# Patient Record
Sex: MALE | Race: WHITE | NOT HISPANIC OR LATINO | RURAL
[De-identification: names, ages, dates, MRNs, and addresses within clinical notes are randomized per-mention and may not be internally consistent; named-entity substitution may affect disease eponyms.]

---

## 2024-08-13 ENCOUNTER — APPOINTMENT (OUTPATIENT)
Age: 49
Setting detail: DERMATOLOGY
End: 2024-08-14

## 2024-08-13 DIAGNOSIS — L21.8 OTHER SEBORRHEIC DERMATITIS: ICD-10-CM

## 2024-08-13 DIAGNOSIS — L663 OTHER SPECIFIED DISEASES OF HAIR AND HAIR FOLLICLES: ICD-10-CM

## 2024-08-13 DIAGNOSIS — L738 OTHER SPECIFIED DISEASES OF HAIR AND HAIR FOLLICLES: ICD-10-CM

## 2024-08-13 PROBLEM — L02.12 FURUNCLE OF NECK: Status: ACTIVE | Noted: 2024-08-13

## 2024-08-13 PROCEDURE — OTHER COUNSELING: OTHER

## 2024-08-13 PROCEDURE — 99203 OFFICE O/P NEW LOW 30 MIN: CPT

## 2024-08-13 PROCEDURE — OTHER PRESCRIPTION: OTHER

## 2024-08-13 PROCEDURE — OTHER MIPS QUALITY: OTHER

## 2024-08-13 PROCEDURE — OTHER PRESCRIPTION MEDICATION MANAGEMENT: OTHER

## 2024-08-13 RX ORDER — CLOBETASOL PROPIONATE 0.5 MG/ML
SOLUTION TOPICAL QD
Qty: 1 | Refills: 3 | Status: ERX | COMMUNITY
Start: 2024-08-13

## 2024-08-13 RX ORDER — DOXYCYCLINE HYCLATE 100 MG/1
CAPSULE, GELATIN COATED ORAL
Qty: 14 | Refills: 2 | Status: ERX | COMMUNITY
Start: 2024-08-13

## 2024-08-13 RX ORDER — DESONIDE 0.5 MG/G
CREAM TOPICAL
Qty: 15 | Refills: 3 | Status: ERX | COMMUNITY
Start: 2024-08-13

## 2024-08-13 ASSESSMENT — LOCATION SIMPLE DESCRIPTION DERM
LOCATION SIMPLE: HAIR
LOCATION SIMPLE: RIGHT ANTERIOR NECK
LOCATION SIMPLE: LEFT ANTERIOR NECK

## 2024-08-13 ASSESSMENT — LOCATION DETAILED DESCRIPTION DERM
LOCATION DETAILED: LEFT SUPERIOR ANTERIOR NECK
LOCATION DETAILED: HAIR
LOCATION DETAILED: LEFT INFERIOR LATERAL NECK
LOCATION DETAILED: RIGHT INFERIOR LATERAL NECK

## 2024-08-13 ASSESSMENT — LOCATION ZONE DERM
LOCATION ZONE: SCALP
LOCATION ZONE: NECK

## 2024-08-13 NOTE — PROCEDURE: PRESCRIPTION MEDICATION MANAGEMENT
Samples Given: CeraVe facial cleanser
Detail Level: Zone
Render In Strict Bullet Format?: No
Initiate Treatment: Doxy 100mg qd for 14 days\\nDesonide cream
Initiate Treatment: Clobetosol scalp solution

## 2024-10-22 ENCOUNTER — APPOINTMENT (OUTPATIENT)
Age: 49
Setting detail: DERMATOLOGY
End: 2024-10-23

## 2024-10-22 DIAGNOSIS — D49.2 NEOPLASM OF UNSPECIFIED BEHAVIOR OF BONE, SOFT TISSUE, AND SKIN: ICD-10-CM

## 2024-10-22 DIAGNOSIS — L738 OTHER SPECIFIED DISEASES OF HAIR AND HAIR FOLLICLES: ICD-10-CM

## 2024-10-22 DIAGNOSIS — L21.8 OTHER SEBORRHEIC DERMATITIS: ICD-10-CM

## 2024-10-22 DIAGNOSIS — L663 OTHER SPECIFIED DISEASES OF HAIR AND HAIR FOLLICLES: ICD-10-CM

## 2024-10-22 PROBLEM — L02.12 FURUNCLE OF NECK: Status: ACTIVE | Noted: 2024-10-22

## 2024-10-22 PROCEDURE — OTHER COUNSELING: OTHER

## 2024-10-22 PROCEDURE — OTHER PRESCRIPTION MEDICATION MANAGEMENT: OTHER

## 2024-10-22 PROCEDURE — OTHER MIPS QUALITY: OTHER

## 2024-10-22 PROCEDURE — 11103 TANGNTL BX SKIN EA SEP/ADDL: CPT

## 2024-10-22 PROCEDURE — 11102 TANGNTL BX SKIN SINGLE LES: CPT

## 2024-10-22 PROCEDURE — OTHER BIOPSY BY SHAVE METHOD: OTHER

## 2024-10-22 PROCEDURE — 99214 OFFICE O/P EST MOD 30 MIN: CPT | Mod: 25

## 2024-10-22 PROCEDURE — OTHER PRESCRIPTION: OTHER

## 2024-10-22 PROCEDURE — OTHER RECOMMENDATIONS: OTHER

## 2024-10-22 PROCEDURE — OTHER SEPARATE AND IDENTIFIABLE DOCUMENTATION: OTHER

## 2024-10-22 RX ORDER — FLUOCINOLONE ACETONIDE 0.11 MG/ML
OIL TOPICAL QHS
Qty: 118.28 | Refills: 3 | Status: ERX | COMMUNITY
Start: 2024-10-22

## 2024-10-22 ASSESSMENT — LOCATION SIMPLE DESCRIPTION DERM
LOCATION SIMPLE: RIGHT ANTERIOR NECK
LOCATION SIMPLE: CHEST
LOCATION SIMPLE: LEFT SHOULDER
LOCATION SIMPLE: LEFT ANTERIOR NECK
LOCATION SIMPLE: HAIR

## 2024-10-22 ASSESSMENT — LOCATION ZONE DERM
LOCATION ZONE: SCALP
LOCATION ZONE: ARM
LOCATION ZONE: NECK
LOCATION ZONE: TRUNK

## 2024-10-22 ASSESSMENT — LOCATION DETAILED DESCRIPTION DERM
LOCATION DETAILED: RIGHT MEDIAL INFERIOR CHEST
LOCATION DETAILED: LEFT SUPERIOR ANTERIOR NECK
LOCATION DETAILED: RIGHT INFERIOR LATERAL NECK
LOCATION DETAILED: LEFT INFERIOR LATERAL NECK
LOCATION DETAILED: HAIR
LOCATION DETAILED: LEFT POSTERIOR SHOULDER

## 2024-10-22 NOTE — PROCEDURE: RECOMMENDATIONS
negative
Render Risk Assessment In Note?: no
Patient Management Risk Assessment: Moderate
Recommendation Preamble: The following recommendations were made during the visit: Biopsy
Detail Level: Zone

## 2024-10-22 NOTE — PROCEDURE: BIOPSY BY SHAVE METHOD
Detail Level: Detailed
Depth Of Biopsy: dermis
Was A Bandage Applied: Yes
Size Of Lesion In Cm: 0.4
X Size Of Lesion In Cm: 0
Biopsy Type: H and E
Biopsy Method: Dermablade
Anesthesia Type: 1% lidocaine with epinephrine
Anesthesia Volume In Cc: 0.5
Hemostasis: Drysol
Wound Care: Petrolatum
Dressing: bandage
Destruction After The Procedure: No
Type Of Destruction Used: Curettage
Curettage Text: The wound bed was treated with curettage after the biopsy was performed.
Cryotherapy Text: The wound bed was treated with cryotherapy after the biopsy was performed.
Electrodesiccation Text: The wound bed was treated with electrodesiccation after the biopsy was performed.
Electrodesiccation And Curettage Text: The wound bed was treated with electrodesiccation and curettage after the biopsy was performed.
Silver Nitrate Text: The wound bed was treated with silver nitrate after the biopsy was performed.
Lab: [id number]
Lab Facility: [id number]
Consent: Written consent was obtained and risks were reviewed including but not limited to scarring, infection, bleeding, scabbing, incomplete removal, nerve damage and allergy to anesthesia.
Post-Care Instructions: I reviewed with the patient in detail post-care instructions. Patient is to keep the biopsy site dry overnight, and then apply bacitracin twice daily until healed. Patient may apply hydrogen peroxide soaks to remove any crusting.
Notification Instructions: Patient will be notified of biopsy results. However, patient instructed to call the office if not contacted within 2 weeks.
Billing Type: Third-Party Bill
Information: Selecting Yes will display possible errors in your note based on the variables you have selected. This validation is only offered as a suggestion for you. PLEASE NOTE THAT THE VALIDATION TEXT WILL BE REMOVED WHEN YOU FINALIZE YOUR NOTE. IF YOU WANT TO FAX A PRELIMINARY NOTE YOU WILL NEED TO TOGGLE THIS TO 'NO' IF YOU DO NOT WANT IT IN YOUR FAXED NOTE.

## 2024-10-22 NOTE — PROCEDURE: PRESCRIPTION MEDICATION MANAGEMENT
Continue Regimen: Doxy 100mg qd for 14 days\\nDesonide cream
Samples Given: CeraVe facial cleanser
Detail Level: Zone
Render In Strict Bullet Format?: No
Continue Regimen: Clobetosol scalp solution

## 2024-12-13 ENCOUNTER — APPOINTMENT (OUTPATIENT)
Age: 49
Setting detail: DERMATOLOGY
End: 2024-12-16

## 2024-12-13 DIAGNOSIS — D22 MELANOCYTIC NEVI: ICD-10-CM

## 2024-12-13 PROBLEM — D22.62 MELANOCYTIC NEVI OF LEFT UPPER LIMB, INCLUDING SHOULDER: Status: ACTIVE | Noted: 2024-12-13

## 2024-12-13 PROCEDURE — OTHER MIPS QUALITY: OTHER

## 2024-12-13 PROCEDURE — 12032 INTMD RPR S/A/T/EXT 2.6-7.5: CPT

## 2024-12-13 PROCEDURE — OTHER EXCISION: OTHER

## 2024-12-13 PROCEDURE — 11403 EXC TR-EXT B9+MARG 2.1-3CM: CPT

## 2024-12-13 ASSESSMENT — LOCATION SIMPLE DESCRIPTION DERM: LOCATION SIMPLE: LEFT SHOULDER

## 2024-12-13 ASSESSMENT — LOCATION DETAILED DESCRIPTION DERM: LOCATION DETAILED: LEFT POSTERIOR SHOULDER

## 2024-12-13 ASSESSMENT — LOCATION ZONE DERM: LOCATION ZONE: ARM

## 2024-12-13 NOTE — PROCEDURE: EXCISION

## 2024-12-23 ENCOUNTER — APPOINTMENT (OUTPATIENT)
Age: 49
Setting detail: DERMATOLOGY
End: 2024-12-24

## 2024-12-23 DIAGNOSIS — D22 MELANOCYTIC NEVI: ICD-10-CM

## 2024-12-23 DIAGNOSIS — Z48.02 ENCOUNTER FOR REMOVAL OF SUTURES: ICD-10-CM

## 2024-12-23 PROBLEM — D48.5 NEOPLASM OF UNCERTAIN BEHAVIOR OF SKIN: Status: ACTIVE | Noted: 2024-12-23

## 2024-12-23 PROCEDURE — OTHER BIOPSY BY SHAVE METHOD: OTHER

## 2024-12-23 PROCEDURE — 11102 TANGNTL BX SKIN SINGLE LES: CPT | Mod: 79

## 2024-12-23 PROCEDURE — 99214 OFFICE O/P EST MOD 30 MIN: CPT | Mod: 24,25

## 2024-12-23 PROCEDURE — OTHER SUTURE REMOVAL (GLOBAL PERIOD): OTHER

## 2024-12-23 PROCEDURE — OTHER SEPARATE AND IDENTIFIABLE DOCUMENTATION: OTHER

## 2024-12-23 PROCEDURE — OTHER RECOMMENDATIONS: OTHER

## 2024-12-23 ASSESSMENT — LOCATION ZONE DERM
LOCATION ZONE: NECK
LOCATION ZONE: ARM

## 2024-12-23 ASSESSMENT — LOCATION SIMPLE DESCRIPTION DERM
LOCATION SIMPLE: POSTERIOR NECK
LOCATION SIMPLE: LEFT SHOULDER

## 2024-12-23 ASSESSMENT — LOCATION DETAILED DESCRIPTION DERM
LOCATION DETAILED: LEFT POSTERIOR NECK
LOCATION DETAILED: LEFT POSTERIOR SHOULDER

## 2024-12-23 NOTE — PROCEDURE: SUTURE REMOVAL (GLOBAL PERIOD)
Detail Level: Detailed
Add 21384 Cpt? (Important Note: In 2017 The Use Of 56239 Is Being Tracked By Cms To Determine Future Global Period Reimbursement For Global Periods): no

## 2024-12-23 NOTE — PROCEDURE: RECOMMENDATIONS
Patient Management Risk Assessment: Moderate
Recommendation Preamble: The following recommendations were made during the visit: Biopsy
Render Risk Assessment In Note?: no
Detail Level: Zone

## 2024-12-23 NOTE — PROCEDURE: BIOPSY BY SHAVE METHOD
Detail Level: Detailed
Depth Of Biopsy: dermis
Was A Bandage Applied: Yes
Size Of Lesion In Cm: 0.3
X Size Of Lesion In Cm: 0
Biopsy Type: H and E
Biopsy Method: Dermablade
Anesthesia Type: 1% lidocaine with epinephrine
Anesthesia Volume In Cc: 0.5
Hemostasis: Drysol
Wound Care: Petrolatum
Dressing: bandage
Destruction After The Procedure: No
Type Of Destruction Used: Curettage
Curettage Text: The wound bed was treated with curettage after the biopsy was performed.
Cryotherapy Text: The wound bed was treated with cryotherapy after the biopsy was performed.
Electrodesiccation Text: The wound bed was treated with electrodesiccation after the biopsy was performed.
Electrodesiccation And Curettage Text: The wound bed was treated with electrodesiccation and curettage after the biopsy was performed.
Silver Nitrate Text: The wound bed was treated with silver nitrate after the biopsy was performed.
Lab: [id number]
Lab Facility: [id number]
Medical Necessity Information: It is in your best interest to select a reason for this procedure from the list below. All of these items fulfill various CMS LCD requirements except the new and changing color options.
Consent: Written consent was obtained and risks were reviewed including but not limited to scarring, infection, bleeding, scabbing, incomplete removal, nerve damage and allergy to anesthesia.
Post-Care Instructions: I reviewed with the patient in detail post-care instructions. Patient is to keep the biopsy site dry overnight, and then apply bacitracin twice daily until healed. Patient may apply hydrogen peroxide soaks to remove any crusting.
Notification Instructions: Patient will be notified of biopsy results. However, patient instructed to call the office if not contacted within 2 weeks.
Billing Type: Third-Party Bill
Information: Selecting Yes will display possible errors in your note based on the variables you have selected. This validation is only offered as a suggestion for you. PLEASE NOTE THAT THE VALIDATION TEXT WILL BE REMOVED WHEN YOU FINALIZE YOUR NOTE. IF YOU WANT TO FAX A PRELIMINARY NOTE YOU WILL NEED TO TOGGLE THIS TO 'NO' IF YOU DO NOT WANT IT IN YOUR FAXED NOTE.

## 2025-06-30 ENCOUNTER — APPOINTMENT (OUTPATIENT)
Age: 50
Setting detail: DERMATOLOGY
End: 2025-07-01

## 2025-06-30 DIAGNOSIS — L81.4 OTHER MELANIN HYPERPIGMENTATION: ICD-10-CM

## 2025-06-30 DIAGNOSIS — L20.89 OTHER ATOPIC DERMATITIS: ICD-10-CM

## 2025-06-30 DIAGNOSIS — L73.8 OTHER SPECIFIED FOLLICULAR DISORDERS: ICD-10-CM

## 2025-06-30 DIAGNOSIS — D49.2 NEOPLASM OF UNSPECIFIED BEHAVIOR OF BONE, SOFT TISSUE, AND SKIN: ICD-10-CM

## 2025-06-30 PROCEDURE — OTHER BIOPSY BY SHAVE METHOD: OTHER

## 2025-06-30 PROCEDURE — OTHER COUNSELING: OTHER

## 2025-06-30 PROCEDURE — OTHER PRESCRIPTION: OTHER

## 2025-06-30 PROCEDURE — 11102 TANGNTL BX SKIN SINGLE LES: CPT

## 2025-06-30 PROCEDURE — 99213 OFFICE O/P EST LOW 20 MIN: CPT | Mod: 25

## 2025-06-30 PROCEDURE — OTHER RECOMMENDATIONS: OTHER

## 2025-06-30 PROCEDURE — OTHER MIPS QUALITY: OTHER

## 2025-06-30 PROCEDURE — OTHER PRESCRIPTION MEDICATION MANAGEMENT: OTHER

## 2025-06-30 PROCEDURE — OTHER TREATMENT REGIMEN: OTHER

## 2025-06-30 RX ORDER — TRIAMCINOLONE ACETONIDE 1 MG/G
CREAM TOPICAL
Qty: 453.6 | Refills: 3 | Status: ERX | COMMUNITY
Start: 2025-06-30

## 2025-06-30 ASSESSMENT — LOCATION SIMPLE DESCRIPTION DERM
LOCATION SIMPLE: LEFT SHOULDER
LOCATION SIMPLE: RIGHT UPPER ARM
LOCATION SIMPLE: LEFT FOREHEAD
LOCATION SIMPLE: RIGHT SHOULDER
LOCATION SIMPLE: LEFT CHEEK

## 2025-06-30 ASSESSMENT — LOCATION DETAILED DESCRIPTION DERM
LOCATION DETAILED: RIGHT POSTERIOR SHOULDER
LOCATION DETAILED: LEFT POSTERIOR SHOULDER
LOCATION DETAILED: LEFT INFERIOR FOREHEAD
LOCATION DETAILED: LEFT LATERAL BUCCAL CHEEK
LOCATION DETAILED: RIGHT ANTERIOR MEDIAL PROXIMAL UPPER ARM
LOCATION DETAILED: LEFT LATERAL MANDIBULAR CHEEK

## 2025-06-30 ASSESSMENT — LOCATION ZONE DERM
LOCATION ZONE: FACE
LOCATION ZONE: ARM

## 2025-07-23 ENCOUNTER — APPOINTMENT (OUTPATIENT)
Age: 50
Setting detail: DERMATOLOGY
End: 2025-07-23

## 2025-07-23 DIAGNOSIS — L57.0 ACTINIC KERATOSIS: ICD-10-CM

## 2025-07-23 PROCEDURE — OTHER MIPS QUALITY: OTHER

## 2025-07-23 PROCEDURE — 17000 DESTRUCT PREMALG LESION: CPT

## 2025-07-23 PROCEDURE — OTHER LIQUID NITROGEN: OTHER

## 2025-07-23 PROCEDURE — 17003 DESTRUCT PREMALG LES 2-14: CPT

## 2025-07-23 PROCEDURE — OTHER COUNSELING: OTHER

## 2025-07-23 ASSESSMENT — LOCATION ZONE DERM
LOCATION ZONE: SCALP
LOCATION ZONE: FACE

## 2025-07-23 ASSESSMENT — LOCATION SIMPLE DESCRIPTION DERM
LOCATION SIMPLE: LEFT FOREHEAD
LOCATION SIMPLE: RIGHT SCALP

## 2025-07-23 ASSESSMENT — LOCATION DETAILED DESCRIPTION DERM
LOCATION DETAILED: LEFT INFERIOR FOREHEAD
LOCATION DETAILED: RIGHT CENTRAL FRONTAL SCALP

## 2025-08-12 ENCOUNTER — APPOINTMENT (OUTPATIENT)
Age: 50
Setting detail: DERMATOLOGY
End: 2025-08-13

## 2025-08-12 DIAGNOSIS — L29.89 OTHER PRURITUS: ICD-10-CM

## 2025-08-12 DIAGNOSIS — L24.2 IRRITANT CONTACT DERMATITIS DUE TO SOLVENTS: ICD-10-CM

## 2025-08-12 PROCEDURE — OTHER PRESCRIPTION MEDICATION MANAGEMENT: OTHER

## 2025-08-12 PROCEDURE — 99213 OFFICE O/P EST LOW 20 MIN: CPT

## 2025-08-12 PROCEDURE — OTHER COUNSELING: OTHER

## 2025-08-12 PROCEDURE — OTHER MIPS QUALITY: OTHER

## 2025-08-12 PROCEDURE — OTHER PRESCRIPTION: OTHER

## 2025-08-12 PROCEDURE — OTHER INTRAMUSCULAR KENALOG: OTHER

## 2025-08-12 PROCEDURE — 96372 THER/PROPH/DIAG INJ SC/IM: CPT

## 2025-08-12 RX ORDER — TRIAMCINOLONE ACETONIDE 0.25 MG/G
OINTMENT TOPICAL
Qty: 80 | Refills: 1 | Status: ERX | COMMUNITY
Start: 2025-08-12

## 2025-08-12 ASSESSMENT — LOCATION ZONE DERM
LOCATION ZONE: TRUNK
LOCATION ZONE: PENIS

## 2025-08-12 ASSESSMENT — LOCATION DETAILED DESCRIPTION DERM
LOCATION DETAILED: DORSAL CORONA
LOCATION DETAILED: PROXIMAL DORSAL GLANS PENIS
LOCATION DETAILED: RIGHT BUTTOCK

## 2025-08-12 ASSESSMENT — LOCATION SIMPLE DESCRIPTION DERM
LOCATION SIMPLE: RIGHT BUTTOCK
LOCATION SIMPLE: DORSAL PENIS
LOCATION SIMPLE: CORONA